# Patient Record
Sex: MALE | Race: ASIAN | NOT HISPANIC OR LATINO | URBAN - METROPOLITAN AREA
[De-identification: names, ages, dates, MRNs, and addresses within clinical notes are randomized per-mention and may not be internally consistent; named-entity substitution may affect disease eponyms.]

---

## 2020-09-19 ENCOUNTER — EMERGENCY (EMERGENCY)
Facility: HOSPITAL | Age: 50
LOS: 1 days | Discharge: HOME | End: 2020-09-19
Attending: EMERGENCY MEDICINE | Admitting: EMERGENCY MEDICINE
Payer: COMMERCIAL

## 2020-09-19 VITALS
SYSTOLIC BLOOD PRESSURE: 153 MMHG | RESPIRATION RATE: 18 BRPM | TEMPERATURE: 97 F | DIASTOLIC BLOOD PRESSURE: 91 MMHG | OXYGEN SATURATION: 99 % | HEART RATE: 86 BPM

## 2020-09-19 VITALS
RESPIRATION RATE: 18 BRPM | SYSTOLIC BLOOD PRESSURE: 136 MMHG | TEMPERATURE: 98 F | OXYGEN SATURATION: 99 % | HEART RATE: 67 BPM | DIASTOLIC BLOOD PRESSURE: 86 MMHG

## 2020-09-19 PROCEDURE — 99283 EMERGENCY DEPT VISIT LOW MDM: CPT

## 2020-09-19 PROCEDURE — 99284 EMERGENCY DEPT VISIT MOD MDM: CPT

## 2020-09-19 NOTE — ED PROVIDER NOTE - PATIENT PORTAL LINK FT
You can access the FollowMyHealth Patient Portal offered by [facility] by registering at the following website: [email or web address]. By joining [employer]’s FollowMyHealth portal, you will also be able to view your health information using other applications (apps) compatible with our system.

## 2020-09-19 NOTE — ED PROVIDER NOTE - OBJECTIVE STATEMENT
50M no PMH c/o nausea. pt states ate seafood earlier and felt nauseated. no vomiting. no diarrhea. no fevers. no abd pain.  no sick contacts. no recent travel. pt states he is now feeling better.

## 2020-09-19 NOTE — ED PROVIDER NOTE - PROGRESS NOTE DETAILS
pt resting comfortably in ED. tolerated po  I have discussed the discharge plan with the patient. The patient agrees with the plan, as discussed.  The patient understands Emergency Department diagnosis is a preliminary diagnosis often based on limited information and that the patient must adhere to the follow-up plan as discussed.  The patient understands that if the symptoms worsen  the patient may return to the Emergency Department at any time for further evaluation and treatment.

## 2020-09-19 NOTE — ED ADULT NURSE REASSESSMENT NOTE - NS ED NURSE REASSESS COMMENT FT1
Pt slept throughout the night. No complains. Pt was given shelter and meal lists/resources and educated on drop-in shelters. Pt agreed to use them.

## 2020-09-19 NOTE — ED PROVIDER NOTE - CONSTITUTIONAL NEGATIVE STATEMENT, MLM
Patient is in the lateral position.   The body was positioned using the following devices: wedge.  The patient was positioned by ROOPA JOHNS  no fever and no chills.

## 2020-09-19 NOTE — ED ADULT NURSE NOTE - OBJECTIVE STATEMENT
Pt Cantonese speaker. Limited English.  Robert walton, ID Number [id number]. Complains of feeling nauseous. [place] and [place]  told him he could not sleep there. Pt states he does not have a home since he could not afford it. Pt denies any vomiting, abdominal pain, or diarrhea.

## 2020-09-19 NOTE — ED PROVIDER NOTE - CLINICAL SUMMARY MEDICAL DECISION MAKING FREE TEXT BOX
nausea after eating seafood, no guarding, no rebound, no evidence of surgical abd. now feeling better. declined labs, declined antiemetic  states would just like to rest a little.

## 2020-09-19 NOTE — ED ADULT NURSE NOTE - CHPI ED NUR SYMPTOMS NEG
no dysuria/no blood in stool/no burning urination/no vomiting/no abdominal distension/no diarrhea/no chills/no fever/no hematuria

## 2020-09-22 DIAGNOSIS — R11.0 NAUSEA: ICD-10-CM

## 2025-07-27 NOTE — ED ADULT NURSE NOTE - NS ED PATIENT SAFETY CONCERN
Patient with hx CORDOVA cirrhosis c/b ascites (requiring paracentesis, on lasix 40 mg qd, eplerenone), grade 1 esophageal varices (EGD on 11/8/2024), hepatic encephalopathy 5 week hx abdominal, back pain since last paracentesis without fevers, chills, nausea, vomiting, diarrhea. 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 CT A/P with concern for peritonitis. Also noted splenomegaly and portal vein thrombosis, seen previously. INR 1.8. Patient had noted lactic acidosis at 2.9, now resolved. Overalll suspect abdominal pain 2/2 ascites with c/f SBP vs. Worsening of chronic portal vein thrombosis Given CTX and  pRBCs at OSH. [place] [facility] [place]. MELD-Na 17.     - Hold lasix and eplerenone   - continue home rifaxamin   - CTM CBC, CMP, PT/INR daily   - Hepatology consulted, appreciate recs  - started lactulose 10mg BID for hepatic encephalopathy prophylaxis   - Liver US showed cirrhotic morphology of the liver, sequela of portal hypertension and large volume of complicated ascites   - paracentesis on 7/21 pulled 4250mL, WBC 14,960 w/ 96% PNMs and aerobic culture was positive for strep mitis oralis   - repeat para on 7/25 pulled 2500mL, WBC 14,860  - per ID, stopped vanc and CTX, started on IV meropenem due to concern of infection with pathogen not covered by previous abx   - will repeat para 5 days after start of meropenem    No